# Patient Record
Sex: MALE
[De-identification: names, ages, dates, MRNs, and addresses within clinical notes are randomized per-mention and may not be internally consistent; named-entity substitution may affect disease eponyms.]

---

## 2024-06-07 ENCOUNTER — APPOINTMENT (OUTPATIENT)
Dept: DERMATOLOGY | Facility: CLINIC | Age: 34
End: 2024-06-07
Payer: MEDICAID

## 2024-06-07 DIAGNOSIS — L81.4 OTHER MELANIN HYPERPIGMENTATION: ICD-10-CM

## 2024-06-07 DIAGNOSIS — L28.0 LICHEN SIMPLEX CHRONICUS: ICD-10-CM

## 2024-06-07 DIAGNOSIS — L30.9 DERMATITIS, UNSPECIFIED: ICD-10-CM

## 2024-06-07 DIAGNOSIS — B07.9 VIRAL WART, UNSPECIFIED: ICD-10-CM

## 2024-06-07 PROBLEM — Z00.00 ENCOUNTER FOR PREVENTIVE HEALTH EXAMINATION: Status: ACTIVE | Noted: 2024-06-07

## 2024-06-07 PROCEDURE — 17110 DESTRUCTION B9 LES UP TO 14: CPT

## 2024-06-07 PROCEDURE — 99204 OFFICE O/P NEW MOD 45 MIN: CPT | Mod: 25

## 2024-06-07 RX ORDER — MOMETASONE FUROATE 1 MG/G
0.1 OINTMENT TOPICAL
Qty: 1 | Refills: 0 | Status: ACTIVE | COMMUNITY
Start: 2024-06-07 | End: 1900-01-01

## 2024-06-07 NOTE — ASSESSMENT
[FreeTextEntry1] : #LSC- chronic, flare -Disc moisturization  -Mometasone oint to AA bid as needed for up to 2 weeks at a time, SED  #VV right vermillion border, 1st cryo session The risks/benefits/alternatives of cryo-destruction was explained to the patient which, include but are not limited to redness, swelling, pain, blistering, scar, discoloration of skin, and recurrence. The patient expressed understanding of these risks and agreed to the procedure. 1 lesion treated with 2 cycles of LN2. The procedure was well tolerated, without complication. We have discussed related skin care.  #Lentigines  - discussed benign nature; no therapy indicated at this time  RTC 4-5 weeks

## 2024-06-07 NOTE — PHYSICAL EXAM
[FreeTextEntry3] : Verrucous papule right upper vermillion border lichenified thin plaque right dorsal index finger Uniform tan macules b/l shoulders

## 2024-06-07 NOTE — HISTORY OF PRESENT ILLNESS
[FreeTextEntry1] : NP bump, rash, spots [de-identified] : NP Here for bump on right upper lip- present for 2-3 months, asx Also with rash on right index finger, only occasionally itchy Spots on shoulders, appeared after time in the sun  SH Works as mental health therapist

## 2024-07-05 ENCOUNTER — APPOINTMENT (OUTPATIENT)
Dept: DERMATOLOGY | Facility: CLINIC | Age: 34
End: 2024-07-05
Payer: MEDICAID

## 2024-07-05 DIAGNOSIS — L28.0 LICHEN SIMPLEX CHRONICUS: ICD-10-CM

## 2024-07-05 DIAGNOSIS — L91.8 OTHER HYPERTROPHIC DISORDERS OF THE SKIN: ICD-10-CM

## 2024-07-05 DIAGNOSIS — B07.9 VIRAL WART, UNSPECIFIED: ICD-10-CM

## 2024-07-05 DIAGNOSIS — L74.510 PRIMARY FOCAL HYPERHIDROSIS, AXILLA: ICD-10-CM

## 2024-07-05 PROCEDURE — 99214 OFFICE O/P EST MOD 30 MIN: CPT

## 2024-07-06 PROBLEM — L91.8 SKIN TAG: Status: ACTIVE | Noted: 2024-07-06

## 2024-07-09 RX ORDER — ONABOTULINUMTOXINA 100 [USP'U]/1
100 INJECTION, POWDER, LYOPHILIZED, FOR SOLUTION INTRADERMAL; INTRAMUSCULAR
Qty: 1 | Refills: 0 | Status: ACTIVE | COMMUNITY
Start: 2024-07-05

## 2024-09-13 ENCOUNTER — APPOINTMENT (OUTPATIENT)
Dept: DERMATOLOGY | Facility: CLINIC | Age: 34
End: 2024-09-13
Payer: MEDICAID

## 2024-09-13 VITALS — BODY MASS INDEX: 38.51 KG/M2 | WEIGHT: 260 LBS | HEIGHT: 69 IN

## 2024-09-13 DIAGNOSIS — L74.510 PRIMARY FOCAL HYPERHIDROSIS, AXILLA: ICD-10-CM

## 2024-09-13 PROCEDURE — 64650 CHEMODENERV ECCRINE GLANDS: CPT

## 2024-09-14 NOTE — ASSESSMENT
[FreeTextEntry1] : 1) Hyperhidrosis of b/l axilla, decreased QOL  irritation with Drysol Here for Botox R/B/A of Neurotoxin injection discussed. Verbal consent obtained Area prepped with chlorhexidine. 50U of Botox injected into each axilla. Patient tolerated the procedure well.       LOT [id number] Expiration 05/2026  RTC 3-4 months

## 2024-09-14 NOTE — HISTORY OF PRESENT ILLNESS
[FreeTextEntry1] : RP hyperhidrosis [de-identified] : RP Severe sweating under axilla- has tried several aluminum anti-perspirants but gets itchy irritated rash after. Non-aluminum deodorants inadequate control Here to start botox  SH Works as mental health therapist

## 2024-12-13 ENCOUNTER — APPOINTMENT (OUTPATIENT)
Dept: DERMATOLOGY | Facility: CLINIC | Age: 34
End: 2024-12-13

## 2025-02-07 ENCOUNTER — APPOINTMENT (OUTPATIENT)
Dept: DERMATOLOGY | Facility: CLINIC | Age: 35
End: 2025-02-07
Payer: MEDICAID

## 2025-02-07 ENCOUNTER — NON-APPOINTMENT (OUTPATIENT)
Age: 35
End: 2025-02-07

## 2025-02-07 DIAGNOSIS — L74.510 PRIMARY FOCAL HYPERHIDROSIS, AXILLA: ICD-10-CM

## 2025-02-07 DIAGNOSIS — L28.0 LICHEN SIMPLEX CHRONICUS: ICD-10-CM

## 2025-02-07 DIAGNOSIS — L21.9 SEBORRHEIC DERMATITIS, UNSPECIFIED: ICD-10-CM

## 2025-02-07 PROCEDURE — 99214 OFFICE O/P EST MOD 30 MIN: CPT | Mod: 25

## 2025-02-07 PROCEDURE — 64650 CHEMODENERV ECCRINE GLANDS: CPT

## 2025-02-07 RX ORDER — KETOCONAZOLE 20 MG/ML
2 SUSPENSION TOPICAL
Qty: 1 | Refills: 11 | Status: ACTIVE | COMMUNITY
Start: 2025-02-07 | End: 1900-01-01

## 2025-05-19 ENCOUNTER — APPOINTMENT (OUTPATIENT)
Dept: DERMATOLOGY | Facility: CLINIC | Age: 35
End: 2025-05-19
Payer: MEDICAID

## 2025-05-19 DIAGNOSIS — L30.9 DERMATITIS, UNSPECIFIED: ICD-10-CM

## 2025-05-19 DIAGNOSIS — L21.9 SEBORRHEIC DERMATITIS, UNSPECIFIED: ICD-10-CM

## 2025-05-19 DIAGNOSIS — L74.510 PRIMARY FOCAL HYPERHIDROSIS, AXILLA: ICD-10-CM

## 2025-05-19 DIAGNOSIS — L28.0 LICHEN SIMPLEX CHRONICUS: ICD-10-CM

## 2025-05-19 PROCEDURE — 64650 CHEMODENERV ECCRINE GLANDS: CPT

## 2025-05-19 PROCEDURE — 99214 OFFICE O/P EST MOD 30 MIN: CPT | Mod: 25

## 2025-05-19 RX ORDER — ALCLOMETASONE DIPROPIONATE 0.5 MG/G
0.05 OINTMENT TOPICAL
Qty: 1 | Refills: 2 | Status: ACTIVE | COMMUNITY
Start: 2025-05-19 | End: 1900-01-01

## 2025-08-25 ENCOUNTER — APPOINTMENT (OUTPATIENT)
Dept: DERMATOLOGY | Facility: CLINIC | Age: 35
End: 2025-08-25

## 2025-08-25 DIAGNOSIS — L21.9 SEBORRHEIC DERMATITIS, UNSPECIFIED: ICD-10-CM

## 2025-08-25 DIAGNOSIS — L74.510 PRIMARY FOCAL HYPERHIDROSIS, AXILLA: ICD-10-CM
